# Patient Record
Sex: FEMALE
[De-identification: names, ages, dates, MRNs, and addresses within clinical notes are randomized per-mention and may not be internally consistent; named-entity substitution may affect disease eponyms.]

---

## 2019-04-24 ENCOUNTER — HOSPITAL ENCOUNTER (EMERGENCY)
Dept: HOSPITAL 42 - ED | Age: 54
Discharge: HOME | End: 2019-04-24
Payer: COMMERCIAL

## 2019-04-24 VITALS — OXYGEN SATURATION: 98 % | DIASTOLIC BLOOD PRESSURE: 69 MMHG | HEART RATE: 58 BPM | SYSTOLIC BLOOD PRESSURE: 140 MMHG

## 2019-04-24 VITALS — RESPIRATION RATE: 18 BRPM | TEMPERATURE: 98.1 F

## 2019-04-24 VITALS — BODY MASS INDEX: 23.1 KG/M2

## 2019-04-24 DIAGNOSIS — R91.8: ICD-10-CM

## 2019-04-24 DIAGNOSIS — R07.81: Primary | ICD-10-CM

## 2019-04-24 DIAGNOSIS — Z87.891: ICD-10-CM

## 2019-04-24 LAB
ALBUMIN SERPL-MCNC: 3.9 G/DL (ref 3–4.8)
ALBUMIN/GLOB SERPL: 1.4 {RATIO} (ref 1.1–1.8)
ALT SERPL-CCNC: 28 U/L (ref 7–56)
APPEARANCE UR: CLEAR
AST SERPL-CCNC: 19 U/L (ref 14–36)
BACTERIA #/AREA URNS HPF: (no result) /HPF
BASOPHILS # BLD AUTO: 0.02 K/MM3 (ref 0–2)
BASOPHILS NFR BLD: 0.3 % (ref 0–3)
BILIRUB UR-MCNC: NEGATIVE MG/DL
BNP SERPL-MCNC: 76.9 PG/ML (ref 0–450)
BUN SERPL-MCNC: 13 MG/DL (ref 7–21)
CALCIUM SERPL-MCNC: 9.3 MG/DL (ref 8.4–10.5)
COLOR UR: YELLOW
EOSINOPHIL # BLD: 0.1 10*3/UL (ref 0–0.7)
EOSINOPHIL NFR BLD: 1.5 % (ref 1.5–5)
ERYTHROCYTE [DISTWIDTH] IN BLOOD BY AUTOMATED COUNT: 12.3 % (ref 11.5–14.5)
GFR NON-AFRICAN AMERICAN: > 60
GLUCOSE UR STRIP-MCNC: NEGATIVE MG/DL
HGB BLD-MCNC: 12.9 G/DL (ref 12–16)
LEUKOCYTE ESTERASE UR-ACNC: NEGATIVE LEU/UL
LIPASE SERPL-CCNC: 37 U/L (ref 23–300)
LYMPHOCYTES # BLD: 2 10*3/UL (ref 1.2–3.4)
LYMPHOCYTES NFR BLD AUTO: 25.6 % (ref 22–35)
MCH RBC QN AUTO: 31.2 PG (ref 25–35)
MCHC RBC AUTO-ENTMCNC: 33.9 G/DL (ref 31–37)
MCV RBC AUTO: 92 FL (ref 80–105)
MONOCYTES # BLD AUTO: 0.5 10*3/UL (ref 0.1–0.6)
MONOCYTES NFR BLD: 6.8 % (ref 1–6)
PH UR STRIP: 6.5 [PH] (ref 4.7–8)
PLATELET # BLD: 263 10^3/UL (ref 120–450)
PMV BLD AUTO: 10.7 FL (ref 7–11)
PROT UR STRIP-MCNC: NEGATIVE MG/DL
RBC # BLD AUTO: 4.14 10^6/UL (ref 3.5–6.1)
RBC # UR STRIP: (no result) /UL
SP GR UR STRIP: <= 1.005 (ref 1–1.03)
TROPONIN I SERPL-MCNC: < 0.01 NG/ML
UROBILINOGEN UR STRIP-ACNC: 0.2 E.U./DL
WBC # BLD AUTO: 7.9 10^3/UL (ref 4.5–11)

## 2019-04-24 PROCEDURE — 83735 ASSAY OF MAGNESIUM: CPT

## 2019-04-24 PROCEDURE — 81001 URINALYSIS AUTO W/SCOPE: CPT

## 2019-04-24 PROCEDURE — 71101 X-RAY EXAM UNILAT RIBS/CHEST: CPT

## 2019-04-24 PROCEDURE — 83880 ASSAY OF NATRIURETIC PEPTIDE: CPT

## 2019-04-24 PROCEDURE — 93005 ELECTROCARDIOGRAM TRACING: CPT

## 2019-04-24 PROCEDURE — 80053 COMPREHEN METABOLIC PANEL: CPT

## 2019-04-24 PROCEDURE — 83690 ASSAY OF LIPASE: CPT

## 2019-04-24 PROCEDURE — 96374 THER/PROPH/DIAG INJ IV PUSH: CPT

## 2019-04-24 PROCEDURE — 84484 ASSAY OF TROPONIN QUANT: CPT

## 2019-04-24 PROCEDURE — 81025 URINE PREGNANCY TEST: CPT

## 2019-04-24 PROCEDURE — 85025 COMPLETE CBC W/AUTO DIFF WBC: CPT

## 2019-04-24 PROCEDURE — 99283 EMERGENCY DEPT VISIT LOW MDM: CPT

## 2019-04-24 PROCEDURE — 76700 US EXAM ABDOM COMPLETE: CPT

## 2019-04-24 NOTE — RAD
Date of service: 04/24/2019



PROCEDURE:  Radiographs of the Chest and Left Ribs.



HISTORY:

lt. sided rib pain x 1 month



COMPARISON:

None available. 



TECHNIQUE:

Frontal radiograph of the chest and multiple oblique radiographs of 

the left ribs were obtained. 4 views obtained.



FINDINGS:



LEFT RIBS:

No acute displaced fracture identified.



LUNGS:

5 mm left upper lobe calcified granuloma.



No focal consolidation.



Please note that chest x-ray has limited sensitivity for the 

detection of pulmonary masses.



PLEURA:

No significant pleural effusion.  No definite pneumothorax.



CARDIOVASCULAR:

Heart size appears within normal limits. 



Prominence of the mediastinum may be exaggerated by slight patient 

obliquity and tortuous aorta.  Alternatives including adenopathy not 

excluded. 



No aortic atherosclerotic calcification present



OTHER FINDINGS:

Degenerative changes of the spine.



IMPRESSION:

5 mm calcified granuloma within the left lung apex.



Prominence of the mediastinum may be exaggerated by slight patient 

obliquity and tortuous aorta; alternatives including adenopathy not 

excluded.  Study marked for PA review. 



No appreciable displaced left-sided rib fracture.

## 2019-04-24 NOTE — US
HISTORY:

LUQ pain x 1 month



COMPARISON:

None available.



TECHNIQUE:

Sonographic evaluation of the abdomen.



FINDINGS:



LIVER:

Measures 14.8 cm in sagittal dimension. Echogenic liver may be seen 

in setting of hepatic parenchymal disease or fatty infiltration.  3.2 

x 2.7 x 2.9 cm right hepatic lobe cyst.  The main portal vein appears 

patent with normal directional flow.   No intrahepatic bile duct 

dilatation.



GALLBLADDER:

No gallstones. No gallbladder wall thickening. Negative sonographic 

Cardenas's sign as assessed by the sonographer.



COMMON BILE DUCT:

Measures 7 mm. 



PANCREAS:

Not well visualized.



RIGHT KIDNEY:

Measures 10.5 x 3.9 x 5.0 cm.  No obstructing calculus or 

hydronephrosis identified. 



LEFT KIDNEY:

Measures 11.6 x 4.3 x 5.7 cm.  No obstructing calculus or 

hydronephrosis identified. 



SPLEEN:

Measures approximately 11.9 cm. 



AORTA:

Limited views appear unremarkable. 



IVC:

Limited views appear unremarkable. 



OTHER FINDINGS:

None. 



IMPRESSION:

Echogenic liver may be seen in setting of hepatic parenchymal disease 

or fatty infiltration. 



3.2 cm right hepatic lobe cyst.



Mild dilatation of the common bile duct.



Additional findings as above.

## 2019-04-24 NOTE — CARD
--------------- APPROVED REPORT --------------





Date of service: 04/24/2019



EKG Measurement

Heart [id number]

IN [id number]

[id number]

[id number]

[id number]



<Conclusion>

Normal sinus rhythm

Normal ECG

## 2019-04-24 NOTE — ED PDOC
Arrival/HPI





- General


Historian: Patient





- History of Present Illness


Narrative History of Present Illness (Text): 





04/24/19 14:30


52 y/o female, no significant pmh, previous smoker, nkda, c/o lt. sided rib pain

 x 1 month.  Pt. stated that she fall on the lt. sided rib about 1 month ago, on

and off pain, had xray show no fracture, seeing and following up by pmd Dr. Parks.  Pt. stated that the pain is still persist, not improved, no chest pain 

or shortness of breath, no numbness or tingling, no palpitation, no night sweat,

no dizziness, no change in vision, no pleuritic pain, no urinary symptoms, no 

other medical or psychological complaints. 














<Fabricio Ramos - Last Filed: 04/24/19 19:31>





<Talia Dai - Last Filed: 04/25/19 18:23>





- General


Chief Complaint: Rib Injury





Past Medical History





- Provider Review


Nursing Documentation Reviewed: Yes





- Infectious Disease


Hx of Infectious Diseases: None





- Tetanus Immunization


Tetanus Immunization: Unknown





- Cardiac


Hx Pacemaker: No





- Neurological


Hx Paralysis: No





- Hematological/Oncological


Hx Blood Transfusions: No


Hx Blood Transfusion Reaction: No





- Musculoskeletal/Rheumatological


Hx Musculoskeletal Disorders: No





- Gastrointestinal


Hx Gastroesophageal Reflux: Yes





- Psychiatric


Hx Emotional Abuse: No


Hx Physical Abuse: No


Hx Substance Use: No





- Surgical History


Other/Comment: Kidney stne removal





- Anesthesia


Hx Anesthesia Reactions: No


Hx Malignant Hyperthermia: No





- Suicidal Assessment


Feels Threatened In Home Enviroment: No





<Fabricio Ramos - Last Filed: 04/24/19 19:31>





Family/Social History





- Physician Review


Nursing Documentation Reviewed: Yes


Family/Social History: Unknown Family HX


Smoking Status: Former Smoker


Hx Alcohol Use: Yes (SOCIAL)


Frequency of alcohol use: Socially


Hx Substance Use: No





<Fabricio Ramos - Last Filed: 04/24/19 19:31>





Allergies/Home Meds





<Fabricio Ramos - Last Filed: 04/24/19 19:31>





<Talia Dai - Last Filed: 04/25/19 18:23>


Allergies/Adverse Reactions: 


Allergies





No Known Allergies Allergy (Verified 04/24/19 14:21)


   








Home Medications: 


                                    Home Meds











 Medication  Instructions  Recorded  Confirmed


 


Naproxen [Naprosyn] 500 mg PO BID PRN 04/24/19 04/24/19


 


Ranitidine HCl [Zantac] 150 mg PO BID 04/24/19 04/24/19














Review of Systems





- Review of Systems


Constitutional: absent: Fatigue, Fevers


Eyes: absent: Vision Changes


ENT: absent: Hearing Changes


Respiratory: absent: SOB, Cough


Cardiovascular: absent: Chest Pain


Gastrointestinal: absent: Abdominal Pain, Diarrhea, Nausea, Vomiting


Genitourinary Female: absent: Dysuria, Frequency


Musculoskeletal: Arthralgias, Myalgias.  absent: Back Pain, Neck Pain, Joint 

Swelling


Skin: absent: Rash, Pruritis


Neurological: absent: Headache, Dizziness


Hemo/Lymphatic: absent: Adenopathy, Easy Bleeding


Psychiatric: absent: Anxiety, Depression





<Fabricio Ramos Q - Last Filed: 04/24/19 19:31>





Physical Exam


Vital Signs Reviewed: Yes





Vital Signs











  Temp Pulse Resp BP Pulse Ox


 


 04/24/19 14:17  98.1 F  81  18  152/70 H  97











Temperature: Afebrile


Blood Pressure: Hypertensive


Pulse: Regular


Respiratory Rate: Normal


Appearance: Positive for: Well-Appearing, Non-Toxic, Comfortable


Pain Distress: Mild


Mental Status: Positive for: Alert and Oriented X 3





- Systems Exam


Head: Present: Atraumatic, Normocephalic


Pupils: Present: PERRL


Extroacular Muscles: Present: EOMI


Conjunctiva: Present: Normal


Mouth: Present: Moist Mucous Membranes


Neck: Present: Normal Range of Motion


Respiratory/Chest: Present: Clear to Auscultation, Good Air Exchange, Tender to 

Palpation (pain is 100% reproducible by palating the lt. lower anterior 

intercostal muscle region, no cva tenderness).  No: Respiratory Distress, 

Accessory Muscle Use, Wheezes, Decreased Breath Sounds, Rales, Retracting, 

Rhonchi, Tachypneic


Cardiovascular: Present: Regular Rate and Rhythm, Normal S1, S2.  No: Murmurs


Abdomen: No: Tenderness, Distention, Peritoneal Signs, Rebound, Guarding


Back: Present: Normal Inspection


Upper Extremity: Present: Normal Inspection, Normal ROM, NORMAL PULSES, 

Neurovascularly Intact, Capillary Refill < 2s.  No: Cyanosis, Edema, Deformity


Lower Extremity: Present: Normal Inspection, NORMAL PULSES, Normal ROM, 

Neurovascularly Intact, Capillary Refill < 2 s.  No: Edema, CALF TENDERNESS, 

Tenderness, Swelling, Deformity


Neurological: Present: GCS=15, CN II-XII Intact, Speech Normal, Motor Func 

Grossly Intact, Normal Cerebellar Funct, Gait Normal, Memory Normal


Skin: Present: Warm, Dry, Normal Color.  No: Rashes


Psychiatric: Present: Alert, Oriented x 3, Normal Insight, Normal Concentration





<Fabricio Ramos HILARIO - Last Filed: 04/24/19 19:31>





Vital Signs











  Temp Pulse Resp BP Pulse Ox


 


 04/24/19 16:56   58 L  18  140/69  98


 


 04/24/19 14:17  98.1 F  81  18  152/70 H  97














<Tootie Daisa - Last Filed: 04/25/19 18:23>





Medical Decision Making


ED Course and Treatment: 





04/24/19 14:41


-Labs


-ekg


-lt. rib xray


-abd sonogram


-IV toradol


-Observe and reassess





04/24/19 16:16


-Urine hcg is negative


-EKG show NSR @ 64 BPM, no ST elevation or depression, no T wave inversion, 

normal DC/QRS/QT intervals. 


-Lt. Rib/chest xray: ER wet read: no gross fracture or dislocation


-Abdominal sonogram Echogenic liver may be seen in setting of hepatic 

parenchymal disease or fatty infiltration.  3.2 cm right hepatic lobe cyst. Mild

 dilatation of the common bile duct.


-Labs are non significant


-Lipase within normal limit


-Trop after 24 hours is negative


-BNP is negative


-UA show no UTI


-All labs and radiology result discussed with the patient, advised to follow up.

 


-Pt. feels well and improved with the toradol and medication given in the ER, 

advised to see orthopedic and pmd within 2 days for additional care. 


-Discharge home with lidoderm, continue your pain medication at home, follow up 

with your own pmd and orthopedic and pulmonologist within2  days, return to the 

ER for any new or worsening signs or symptoms. 

















- RAD Interpretation


Radiology Orders: 





-Lt. Rib/chest xray


--------------------------------------

-------------------------------------------------------------------------


-Abdominal sonogram





HISTORY:


LUQ pain x 1 month





COMPARISON:


None available.





TECHNIQUE:


Sonographic evaluation of the abdomen.





FINDINGS:





LIVER:


Measures 14.8 cm in sagittal dimension. Echogenic liver may be seen in setting 

of hepatic parenchymal disease or fatty infiltration.  3.2 x 2.7 x 2.9 cm right 

hepatic lobe cyst.  The main portal vein appears patent with normal directional 

flow.   No intrahepatic bile duct dilatation.





GALLBLADDER:


No gallstones. No gallbladder wall thickening. Negative sonographic Cardenas's si

gn as assessed by the sonographer.





COMMON BILE DUCT:


Measures 7 mm. 





PANCREAS:


Not well visualized.





RIGHT KIDNEY:


Measures 10.5 x 3.9 x 5.0 cm.  No obstructing calculus or hydronephrosis 

identified. 





LEFT KIDNEY:


Measures 11.6 x 4.3 x 5.7 cm.  No obstructing calculus or hydronephrosis 

identified. 





SPLEEN:


Measures approximately 11.9 cm. 





AORTA:


Limited views appear unremarkable. 





IVC:


Limited views appear unremarkable. 





OTHER FINDINGS:


None. 





IMPRESSION:


Echogenic liver may be seen in setting of hepatic parenchymal disease or fatty 

infiltration. 





3.2 cm right hepatic lobe cyst.





Mild dilatation of the common bile duct.





Additional findings as above.








: Radiologist





<Fabricio Ramos Q - Last Filed: 04/24/19 19:31>





- Lab Interpretations


Lab Results: 





                                        











Troponin I  < 0.01 ng/mL  04/24/19  15:14    








                                        











NT-Pro-B Natriuret Pep  76.9 pg/mL (0-450)   04/24/19  15:14    








                                        











Total Bilirubin  0.4 mg/dL (0.2-1.3)   04/24/19  15:14    


 


AST  19 U/L (14-36)   04/24/19  15:14    


 


ALT  28 U/L (7-56)   04/24/19  15:14    


 


Alkaline Phosphatase  107 U/L ()   04/24/19  15:14    


 


Total Protein  6.8 g/dL (5.8-8.3)   04/24/19  15:14    


 


Albumin  3.9 g/dL (3.0-4.8)   04/24/19  15:14    


 


Globulin  2.9 gm/dL  04/24/19  15:14    


 


Albumin/Globulin Ratio  1.4  (1.1-1.8)   04/24/19  15:14    








                                        











Lipase  37 U/L ()   04/24/19  15:14    








                                        











Urine Color  Yellow  (YELLOW)   04/24/19  15:14    


 


Urine Appearance  Clear  (CLEAR)   04/24/19  15:14    


 


Urine pH  6.5  (4.7-8.0)   04/24/19  15:14    


 


Ur Specific Gravity  <= 1.005  (1.005-1.035)   04/24/19  15:14    


 


Urine Protein  Negative mg/dL (<30 mg/dL)   04/24/19  15:14    


 


Urine Glucose (UA)  Negative mg/dL (NEGATIVE)   04/24/19  15:14    


 


Urine Ketones  Negative mg/dL (NEGATIVE)   04/24/19  15:14    


 


Urine Blood  Small  (NEGATIVE)  H  04/24/19  15:14    


 


Urine Nitrate  Negative  (NEGATIVE)   04/24/19  15:14    


 


Urine Bilirubin  Negative  (NEGATIVE)   04/24/19  15:14    


 


Urine Urobilinogen  0.2 E.U./dL (<1 E.U./dL)   04/24/19  15:14    


 


Ur Leukocyte Esterase  Negative Dorothea/uL (NEGATIVE)   04/24/19  15:14    


 


Urine RBC  2 - 5 /hpf (0-2)  H  04/24/19  15:14    


 


Urine WBC  1 - 3 /hpf (0-6)   04/24/19  15:14    


 


Ur Epithelial Cells  4 - 5 /hpf (0-5)   04/24/19  15:14    


 


Urine Bacteria  Few /hpf (NONE)   04/24/19  15:14    














- RAD Interpretation


Radiology Orders: 











04/24/19 14:34


RIBS LEFT & PA CHEST [RAD] Stat 





04/24/19 14:36


ABDOMEN COMPLETE [US] Stat 














- Medication Orders


Current Medication Orders: 














Discontinued Medications





Ketorolac Tromethamine (Toradol)  30 mg IVP STAT STA


   Stop: 04/24/19 14:36


   Last Admin: 04/24/19 15:08  Dose: 30 mg





MAR Pain Assessment


 Document     04/24/19 15:08  BB  (Rec: 04/24/19 15:09  BB  [id number])


     Pain Reassessment


      Is this a pain reassessment?               No


     Sleep


      Is patient sleeping during reassessment?   No


     Presence of Pain


      Presence of Pain                           Yes


     Pain Scale Used


     Protocol:  PSCALES


      Pain Scale Used                            Numeric


     Location


      Left, Right or Bilateral                   Left


      Pain Location Body Site                    Chest


     Description


      Description                                Constant


      Intensity of Pain at present               9


IVP Administration


 Document     04/24/19 15:08  BB  (Rec: 04/24/19 15:09  BB  [id number])


     Charges for Administration


      # of IVP Administrations                   1





Lidocaine (Lidoderm)  1 ea TD STAT STA


   Stop: 04/24/19 16:16


   Last Admin: 04/24/19 16:53  Dose: 1 ea





MAR Transdermal Patch Site


 Document     04/24/19 16:53  BB  (Rec: 04/24/19 16:53  BB  [id number])


     Transdermal Patch Site


      Transdermal Patch Site                     Left Upper Chest














<Talia Dai - Last Filed: 04/25/19 18:23>





- PA / NP / Resident Statement


MD/ has reviewed & agrees with the documentation as recorded.





<Fabricio Ramos - Last Filed: 04/24/19 19:31>





Disposition/Present on Arrival





- Present on Arrival


Any Indicators Present on Arrival: No


History of DVT/PE: No


History of Uncontrolled Diabetes: No


Urinary Catheter: No


History of Decub. Ulcer: No


History Surgical Site Infection Following: None





- Disposition


Have Diagnosis and Disposition been Completed?: Yes


Disposition Time: 16:18


Patient Plan: Discharge





<Fabricio Ramos - Last Filed: 04/24/19 19:31>





<Talia Dai - Last Filed: 04/25/19 18:23>





- Disposition


Diagnosis: 


 Fall, Rib pain, Abnormal chest x-ray





Disposition: HOME/ ROUTINE


Condition: IMPROVED


Additional Instructions: 


-Discharge home with lidoderm, continue your pain medication at home, follow up 

with your own pmd and orthopedic and pulmonologist within2  days, return to the 

ER for any new or worsening signs or symptoms. 











Prescriptions: 


Lidocaine 5% [Lidoderm] 1 patch TOP DAILY PRN #14 patch


 PRN Reason: Other


Referrals: 


Rema Parks MD [Family Provider] - Follow up with primary


Jeet Bourgeois MD [Staff Provider] - Follow up with primary


Kamlesh Le DO [Staff Provider] - Follow up with primary


Jeet Mejias MD [Staff Provider] - Follow up with primary


Forms:  [employer] (English), WORK NOTE





Addendum


Addendum: 





04/25/19 18:22


Spoke with patient and her  regarding Left Rib Film updated read, as 

shown below. They both verbalized understanding and states they will followup 

with Dr. Parks tomorrow. 





Ribs Left:


FINDINGS:


LEFT RIBS:


No acute displaced fracture identified.


LUNGS:


5 mm left upper lobe calcified granuloma.


No focal consolidation.


Please note that chest x-ray has limited sensitivity for the detection of 

pulmonary masses.


PLEURA:


No significant pleural effusion.  No definite pneumothorax.


CARDIOVASCULAR:


Heart size appears within normal limits. 


Prominence of the mediastinum may be exaggerated by slight patient obliquity and

tortuous aorta.  Alternatives including adenopathy not excluded. 


No aortic atherosclerotic calcification present


OTHER FINDINGS:


Degenerative changes of the spine.


IMPRESSION:


5 mm calcified granuloma within the left lung apex.


Prominence of the mediastinum may be exaggerated by slight patient obliquity and

tortuous aorta; alternatives including adenopathy not excluded.  Study marked 

for PA review. 


No appreciable displaced left-sided rib fracture.

















<Talia Dai - Last Filed: 04/25/19 18:23>